# Patient Record
Sex: FEMALE | URBAN - METROPOLITAN AREA
[De-identification: names, ages, dates, MRNs, and addresses within clinical notes are randomized per-mention and may not be internally consistent; named-entity substitution may affect disease eponyms.]

---

## 2021-07-30 ENCOUNTER — OFFICE VISIT (OUTPATIENT)
Dept: URBAN - METROPOLITAN AREA CLINIC 27 | Facility: CLINIC | Age: 71
End: 2021-07-30
Payer: COMMERCIAL

## 2021-07-30 DIAGNOSIS — H26.9 CATARACT: ICD-10-CM

## 2021-07-30 DIAGNOSIS — E11.3393 TYPE 2 DIAB WITH MOD NONP RTNOP WITHOUT MACULAR EDEMA, BI: ICD-10-CM

## 2021-07-30 DIAGNOSIS — H04.123 DRY EYE SYNDROME OF BILATERAL LACRIMAL GLANDS: ICD-10-CM

## 2021-07-30 DIAGNOSIS — E11.3593 TYPE 2 DIAB WITH PROLIF DIAB RTNOP WITHOUT MACULAR EDEMA, BI: Primary | ICD-10-CM

## 2021-07-30 DIAGNOSIS — H43.813 VITREOUS DEGENERATION, BILATERAL: ICD-10-CM

## 2021-07-30 DIAGNOSIS — E11.3592 TYPE 2 DIAB WITH PROLIF DIAB RTNOP WITHOUT MCLR EDEMA, L EYE: ICD-10-CM

## 2021-07-30 PROCEDURE — 99204 OFFICE O/P NEW MOD 45 MIN: CPT | Performed by: OPHTHALMOLOGY

## 2021-07-30 ASSESSMENT — INTRAOCULAR PRESSURE
OS: 15
OD: 16

## 2021-07-30 NOTE — IMPRESSION/PLAN
Impression: PDR OS.
  s/p PRP OS Plan: Exam and OCT reveal no DME. An IVFA from 7/30/21 demonstrated no NVE. Fundus photos from 7/30/21 demonstrated no NVD. Observe. BS control.

## 2021-07-30 NOTE — IMPRESSION/PLAN
Impression: PDR OD.
s/p PRP OD
s/p multiple injections last  4/2021 (in [facility]) Plan: DME since 1980. Exam and OCT reveal no DME. An IVFA from 7/30/21 demonstrated no NVE. Fundus photos from 7/30/21 demonstrated no NVD. Observe. BS control. 

Return in 3 months for follow up, OCT OU, IVFA OD 1st